# Patient Record
Sex: FEMALE | Race: WHITE | Employment: OTHER | URBAN - METROPOLITAN AREA
[De-identification: names, ages, dates, MRNs, and addresses within clinical notes are randomized per-mention and may not be internally consistent; named-entity substitution may affect disease eponyms.]

---

## 2017-10-05 PROBLEM — Z96.659 HISTORY OF PARTIAL KNEE REPLACEMENT: Status: ACTIVE | Noted: 2017-10-05

## 2017-10-05 PROBLEM — M25.561 ACUTE PAIN OF RIGHT KNEE: Status: ACTIVE | Noted: 2017-10-05

## 2018-11-06 ENCOUNTER — APPOINTMENT (OUTPATIENT)
Dept: CT IMAGING | Age: 68
End: 2018-11-06
Payer: MEDICARE

## 2018-11-06 ENCOUNTER — HOSPITAL ENCOUNTER (EMERGENCY)
Age: 68
Discharge: HOME | End: 2018-11-06
Attending: EMERGENCY MEDICINE
Payer: MEDICARE

## 2018-11-06 VITALS
DIASTOLIC BLOOD PRESSURE: 55 MMHG | HEART RATE: 89 BPM | WEIGHT: 110 LBS | TEMPERATURE: 98.4 F | SYSTOLIC BLOOD PRESSURE: 114 MMHG | RESPIRATION RATE: 16 BRPM | BODY MASS INDEX: 22.18 KG/M2 | OXYGEN SATURATION: 93 % | HEIGHT: 59 IN

## 2018-11-06 DIAGNOSIS — J32.9 CHRONIC SINUSITIS, UNSPECIFIED LOCATION: ICD-10-CM

## 2018-11-06 DIAGNOSIS — R51.9 NONINTRACTABLE HEADACHE, UNSPECIFIED CHRONICITY PATTERN, UNSPECIFIED HEADACHE TYPE: Primary | ICD-10-CM

## 2018-11-06 DIAGNOSIS — R11.2 NAUSEA AND VOMITING, INTRACTABILITY OF VOMITING NOT SPECIFIED, UNSPECIFIED VOMITING TYPE: ICD-10-CM

## 2018-11-06 PROCEDURE — 6360000002 HC RX W HCPCS: Performed by: EMERGENCY MEDICINE

## 2018-11-06 PROCEDURE — 70450 CT HEAD/BRAIN W/O DYE: CPT

## 2018-11-06 PROCEDURE — 6370000000 HC RX 637 (ALT 250 FOR IP): Performed by: EMERGENCY MEDICINE

## 2018-11-06 PROCEDURE — 2580000003 HC RX 258: Performed by: EMERGENCY MEDICINE

## 2018-11-06 PROCEDURE — 96375 TX/PRO/DX INJ NEW DRUG ADDON: CPT

## 2018-11-06 PROCEDURE — 99284 EMERGENCY DEPT VISIT MOD MDM: CPT

## 2018-11-06 PROCEDURE — 96374 THER/PROPH/DIAG INJ IV PUSH: CPT

## 2018-11-06 RX ORDER — ONDANSETRON 4 MG/1
4 TABLET, ORALLY DISINTEGRATING ORAL EVERY 8 HOURS PRN
Qty: 24 TABLET | Refills: 0 | Status: SHIPPED | OUTPATIENT
Start: 2018-11-06 | End: 2021-06-28

## 2018-11-06 RX ORDER — ACETAMINOPHEN 500 MG
1000 TABLET ORAL ONCE
Status: COMPLETED | OUTPATIENT
Start: 2018-11-06 | End: 2018-11-06

## 2018-11-06 RX ORDER — DEXAMETHASONE SODIUM PHOSPHATE 10 MG/ML
10 INJECTION INTRAMUSCULAR; INTRAVENOUS ONCE
Status: COMPLETED | OUTPATIENT
Start: 2018-11-06 | End: 2018-11-06

## 2018-11-06 RX ORDER — DIPHENHYDRAMINE HYDROCHLORIDE 50 MG/ML
50 INJECTION INTRAMUSCULAR; INTRAVENOUS ONCE
Status: COMPLETED | OUTPATIENT
Start: 2018-11-06 | End: 2018-11-06

## 2018-11-06 RX ORDER — METHYLPREDNISOLONE 4 MG/1
TABLET ORAL
Qty: 1 KIT | Status: SHIPPED | OUTPATIENT
Start: 2018-11-06 | End: 2018-11-12

## 2018-11-06 RX ORDER — 0.9 % SODIUM CHLORIDE 0.9 %
1000 INTRAVENOUS SOLUTION INTRAVENOUS ONCE
Status: COMPLETED | OUTPATIENT
Start: 2018-11-06 | End: 2018-11-06

## 2018-11-06 RX ADMIN — ACETAMINOPHEN 1000 MG: 500 TABLET ORAL at 11:22

## 2018-11-06 RX ADMIN — DIPHENHYDRAMINE HYDROCHLORIDE 50 MG: 50 INJECTION, SOLUTION INTRAMUSCULAR; INTRAVENOUS at 11:22

## 2018-11-06 RX ADMIN — SODIUM CHLORIDE 1000 ML: 9 INJECTION, SOLUTION INTRAVENOUS at 11:22

## 2018-11-06 RX ADMIN — DEXAMETHASONE SODIUM PHOSPHATE 10 MG: 10 INJECTION INTRAMUSCULAR; INTRAVENOUS at 12:09

## 2018-11-06 RX ADMIN — PROCHLORPERAZINE EDISYLATE 10 MG: 5 INJECTION INTRAMUSCULAR; INTRAVENOUS at 11:22

## 2018-11-06 ASSESSMENT — PAIN DESCRIPTION - LOCATION
LOCATION: HEAD
LOCATION: HEAD

## 2018-11-06 ASSESSMENT — ENCOUNTER SYMPTOMS
SINUS PRESSURE: 1
VOMITING: 1
NAUSEA: 1
SHORTNESS OF BREATH: 0
COUGH: 0
BACK PAIN: 0

## 2018-11-06 ASSESSMENT — PAIN SCALES - GENERAL
PAINLEVEL_OUTOF10: 7
PAINLEVEL_OUTOF10: 2
PAINLEVEL_OUTOF10: 8

## 2018-11-06 ASSESSMENT — PAIN DESCRIPTION - ORIENTATION
ORIENTATION: LEFT
ORIENTATION: LEFT

## 2018-11-06 ASSESSMENT — PAIN DESCRIPTION - DESCRIPTORS
DESCRIPTORS: ACHING;HEADACHE
DESCRIPTORS: HEADACHE;ACHING

## 2018-11-06 ASSESSMENT — PAIN DESCRIPTION - PAIN TYPE
TYPE: ACUTE PAIN
TYPE: ACUTE PAIN

## 2018-11-06 ASSESSMENT — PAIN DESCRIPTION - FREQUENCY: FREQUENCY: CONTINUOUS

## 2018-11-06 ASSESSMENT — PAIN SCALES - WONG BAKER
WONGBAKER_NUMERICALRESPONSE: 2
WONGBAKER_NUMERICALRESPONSE: 6

## 2018-11-06 NOTE — ED PROVIDER NOTES
Cardiovascular:   Tachycardic   Pulmonary/Chest: Effort normal and breath sounds normal. No respiratory distress. She has no wheezes. She has no rales. Abdominal: Soft. There is no tenderness. There is no rebound and no guarding. Musculoskeletal: She exhibits no edema. Neurological: She is alert and oriented to person, place, and time. No cranial nerve deficit. No facial droop, no nystagmus, normal finger to nose, normal gait   Skin: Skin is warm and dry. No rash noted. Psychiatric: She has a normal mood and affect. Her behavior is normal. Thought content normal.   Nursing note and vitals reviewed. Procedures    MDM  Number of Diagnoses or Management Options  Chronic sinusitis, unspecified location:   Nausea and vomiting, intractability of vomiting not specified, unspecified vomiting type:   Nonintractable headache, unspecified chronicity pattern, unspecified headache type:   Diagnosis management comments: This is a 70-year-old female with a past history of migraines who presented to the ED for evaluation of headaches. The patient stated that she didn't have any headaches for the past one month and stated they have been different than her typical migraines. On physical examination the patient was extremely well-appearing and was in no distress whatsoever. Patient had unremarkable neurological examination and CAT scan of the patient's head did not demonstrate any intracranial abnormalities. The patient was treated with IV fluids, Decadron as well as Compazine and Benadryl which on repeat examination did provide improvement. I suspect the patient's symptoms could possibly be due to sinus infection and the patient was given Solu-Medrol Dosepak for home. Patient was advised to follow-up with her primary care physician and return to ED if she should've any fevers, dizziness or any other concerns whatever. ED Course as of Nov 06 1310   Tue Nov 06, 2018   1203 Patient markedly better.   Patient having a lot of sinus issues. We'll prescribe Medrol Dosepak encourage. Patient to take her zyrtec regularly. Patient instructed to make an appointment for follow-up with Dr. Negin Chery in 2-3 days. Patient given warning signs for when to return. [PAULA]      ED Course User Index  [PAULA] Jorge Man DO         ED Course as of  1310   Tue 2018   1203 Patient markedly better. Patient having a lot of sinus issues. We'll prescribe Medrol Dosepak encourage. Patient to take her zyrtec regularly. Patient instructed to make an appointment for follow-up with Dr. Negin Chery in 2-3 days. Patient given warning signs for when to return. [PAULA]      ED Course User Index  [PAULA] Jorge Man DO       --------------------------------------------- PAST HISTORY ---------------------------------------------  Past Medical History:  has a past medical history of Anxiety; Arthritis; GERD (gastroesophageal reflux disease); H/O migraine; History of EKG; Hyperlipidemia; Insomnia; Overactive bladder; Preoperative clearance; Sinus problem; and Ventricular arrhythmia. Past Surgical History:  has a past surgical history that includes  section; Hemorrhoid surgery; blepharoplasty (Bilateral); knee surgery (Right, 5/11/15); Tooth Extraction (2016); Tonsillectomy (Right, 2016); Finger trigger release (Right, 2005); joint replacement; joint replacement (Right, 2015); Dilation and curettage of uterus (1976); and Colonoscopy. Social History:  reports that she has never smoked. She has never used smokeless tobacco. She reports that she does not drink alcohol or use drugs. Family History: family history is not on file. The patients home medications have been reviewed. Allergies: Patient has no known allergies.     -------------------------------------------------- RESULTS -------------------------------------------------  Labs:  No results found for this visit on

## 2019-06-07 ENCOUNTER — OFFICE VISIT (OUTPATIENT)
Dept: CARDIOLOGY CLINIC | Age: 69
End: 2019-06-07
Payer: MEDICARE

## 2019-06-07 ENCOUNTER — HOSPITAL ENCOUNTER (OUTPATIENT)
Dept: CARDIOLOGY | Age: 69
Discharge: HOME | End: 2019-06-07
Payer: MEDICARE

## 2019-06-07 VITALS
BODY MASS INDEX: 22.4 KG/M2 | SYSTOLIC BLOOD PRESSURE: 130 MMHG | RESPIRATION RATE: 16 BRPM | HEIGHT: 59 IN | HEART RATE: 68 BPM | WEIGHT: 111.1 LBS | DIASTOLIC BLOOD PRESSURE: 80 MMHG

## 2019-06-07 DIAGNOSIS — I38 VALVULAR HEART DISEASE: ICD-10-CM

## 2019-06-07 DIAGNOSIS — E78.2 MIXED HYPERLIPIDEMIA: ICD-10-CM

## 2019-06-07 DIAGNOSIS — I51.9 HEART PROBLEM: ICD-10-CM

## 2019-06-07 DIAGNOSIS — H53.9 VISION CHANGES: ICD-10-CM

## 2019-06-07 DIAGNOSIS — Q21.12 PFO (PATENT FORAMEN OVALE): Primary | ICD-10-CM

## 2019-06-07 LAB
LV EF: 58 %
LVEF MODALITY: NORMAL

## 2019-06-07 PROCEDURE — 93000 ELECTROCARDIOGRAM COMPLETE: CPT | Performed by: INTERNAL MEDICINE

## 2019-06-07 PROCEDURE — 99203 OFFICE O/P NEW LOW 30 MIN: CPT | Performed by: INTERNAL MEDICINE

## 2019-06-07 PROCEDURE — 93306 TTE W/DOPPLER COMPLETE: CPT

## 2019-06-07 PROCEDURE — 2580000003 HC RX 258: Performed by: INTERNAL MEDICINE

## 2019-06-07 RX ORDER — VIT C/B6/B5/MAGNESIUM/HERB 173 50-5-6-5MG
CAPSULE ORAL
COMMUNITY
End: 2022-04-30

## 2019-06-07 RX ORDER — DIPHENOXYLATE HYDROCHLORIDE AND ATROPINE SULFATE 2.5; .025 MG/1; MG/1
1 TABLET ORAL 4 TIMES DAILY PRN
COMMUNITY

## 2019-06-07 RX ORDER — SODIUM CHLORIDE 0.9 % (FLUSH) 0.9 %
10 SYRINGE (ML) INJECTION PRN
Status: DISCONTINUED | OUTPATIENT
Start: 2019-06-07 | End: 2019-06-08 | Stop reason: HOSPADM

## 2019-06-07 RX ORDER — CHLORDIAZEPOXIDE HYDROCHLORIDE 10 MG/1
10 CAPSULE, GELATIN COATED ORAL 3 TIMES DAILY PRN
COMMUNITY

## 2019-06-07 RX ADMIN — Medication 10 ML: at 14:52

## 2019-06-07 RX ADMIN — Medication 10 ML: at 14:54

## 2019-06-07 RX ADMIN — Medication 10 ML: at 14:56

## 2019-06-09 NOTE — PROGRESS NOTES
rhinorrhea  Neck: Supple, no elevated JVP, no carotid bruits  Lungs: Clear to auscultation bilaterally. No wheezes, rales, or rhonchi. Cardiac: Regular rate and rhythm, +S1S2, no murmurs apparent  Abdomen: Soft, nontender, +bowel sounds  Extremities: Moves all extremities x 4, no lower extremity edema  Neurologic: No focal motor deficits apparent, normal mood and affect  Peripheral Pulses: Intact posterior tibial pulses bilaterally    Laboratory Tests:  Lab Results   Component Value Date    CREATININE 0.7 05/12/2015    BUN 9 05/12/2015     05/12/2015    K 3.9 05/12/2015    CL 99 05/12/2015    CO2 26 05/12/2015     Lab Results   Component Value Date    WBC 6.1 05/14/2015    HGB 10.2 (L) 05/14/2015    HCT 30.0 (L) 05/14/2015    MCV 94.5 05/14/2015     05/14/2015     Cardiac Tests:  ECG: SR, rate 68, no acute STT changes    Echocardiogram: 6/7/19 ([place])   Normal left ventricular systolic function.   Ejection fraction is visually estimated at 55-60%.   Normal right ventricular size and function (TAPSE 2.2 cm).   With valsalva, right to left interatrial shunting on bubble study.   Moderate mitral regurgitation.   Mild-moderate tricuspid regurgitation.   PASP is estimated at 31 mmHg (normal estimated PASP). ASSESSMENT / PLAN:  1. PFO  2. Mild-moderate valve disease (including moderate MR)  3. History of double vision (left eye; follows with ophthalmology)  4. HLD -- on statin; lipid panel (6/4/19) -- chol 186, HDL 91, LDL 77, trig 97  5. Carotid US (5/31/19): no significant stenosis    - Echocardiogram results reviewed with the patient -- serial echocardiograms  - Education provided re: PFO -- pending clinical course, options for work-up discussed  - Continue current medications    Thank you for allowing me to participate in your patient's care. Please feel free to contact me if you have any questions or concerns.     Kevin Harrell MD  [facility]) Cardiology

## 2022-04-30 ENCOUNTER — APPOINTMENT (OUTPATIENT)
Dept: CT IMAGING | Age: 72
End: 2022-04-30
Payer: MEDICARE

## 2022-04-30 ENCOUNTER — HOSPITAL ENCOUNTER (EMERGENCY)
Age: 72
Discharge: HOME | End: 2022-04-30
Attending: STUDENT IN AN ORGANIZED HEALTH CARE EDUCATION/TRAINING PROGRAM
Payer: MEDICARE

## 2022-04-30 VITALS
HEART RATE: 72 BPM | SYSTOLIC BLOOD PRESSURE: 121 MMHG | BODY MASS INDEX: 20.4 KG/M2 | RESPIRATION RATE: 16 BRPM | OXYGEN SATURATION: 97 % | WEIGHT: 101 LBS | DIASTOLIC BLOOD PRESSURE: 62 MMHG | TEMPERATURE: 97.3 F
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
## 2022-04-30 DIAGNOSIS — S16.1XXA STRAIN OF NECK MUSCLE, INITIAL ENCOUNTER: Primary | ICD-10-CM

## 2022-04-30 PROCEDURE — 99284 EMERGENCY DEPT VISIT MOD MDM: CPT

## 2022-04-30 PROCEDURE — 96372 THER/PROPH/DIAG INJ SC/IM: CPT

## 2022-04-30 PROCEDURE — 6360000002 HC RX W HCPCS: Performed by: STUDENT IN AN ORGANIZED HEALTH CARE EDUCATION/TRAINING PROGRAM

## 2022-04-30 PROCEDURE — 6370000000 HC RX 637 (ALT 250 FOR IP): Performed by: STUDENT IN AN ORGANIZED HEALTH CARE EDUCATION/TRAINING PROGRAM

## 2022-04-30 PROCEDURE — 72125 CT NECK SPINE W/O DYE: CPT

## 2022-04-30 RX ORDER — CYCLOBENZAPRINE HCL 10 MG
10 TABLET ORAL ONCE
Status: COMPLETED | OUTPATIENT
Start: 2022-04-30 | End: 2022-04-30

## 2022-04-30 RX ORDER — KETOROLAC TROMETHAMINE 30 MG/ML
30 INJECTION, SOLUTION INTRAMUSCULAR; INTRAVENOUS ONCE
Status: COMPLETED | OUTPATIENT
Start: 2022-04-30 | End: 2022-04-30

## 2022-04-30 RX ORDER — NAPROXEN 250 MG/1
250 TABLET ORAL 2 TIMES DAILY WITH MEALS
Qty: 20 TABLET | Refills: 0 | Status: SHIPPED | OUTPATIENT
Start: 2022-04-30 | End: 2022-06-27

## 2022-04-30 RX ORDER — CYCLOBENZAPRINE HCL 10 MG
10 TABLET ORAL 3 TIMES DAILY PRN
Qty: 21 TABLET | Refills: 0 | Status: SHIPPED | OUTPATIENT
Start: 2022-04-30 | End: 2022-05-10

## 2022-04-30 RX ORDER — KETOROLAC TROMETHAMINE 30 MG/ML
30 INJECTION, SOLUTION INTRAMUSCULAR; INTRAVENOUS ONCE
Status: DISCONTINUED | OUTPATIENT
Start: 2022-04-30 | End: 2022-04-30

## 2022-04-30 RX ADMIN — CYCLOBENZAPRINE 10 MG: 10 TABLET, FILM COATED ORAL at 15:33

## 2022-04-30 RX ADMIN — KETOROLAC TROMETHAMINE 30 MG: 30 INJECTION, SOLUTION INTRAMUSCULAR at 15:33

## 2022-04-30 ASSESSMENT — ENCOUNTER SYMPTOMS
SORE THROAT: 0
COUGH: 0
ABDOMINAL PAIN: 0
APNEA: 0
DIARRHEA: 0
PHOTOPHOBIA: 0
CONSTIPATION: 0
VOMITING: 0
WHEEZING: 0
CHEST TIGHTNESS: 0
TROUBLE SWALLOWING: 0
RHINORRHEA: 0
NAUSEA: 0
EYE PAIN: 0
BACK PAIN: 0
SHORTNESS OF BREATH: 0

## 2022-04-30 ASSESSMENT — PAIN SCALES - GENERAL
PAINLEVEL_OUTOF10: 3
PAINLEVEL_OUTOF10: 5
PAINLEVEL_OUTOF10: 5

## 2022-04-30 ASSESSMENT — PAIN DESCRIPTION - PAIN TYPE: TYPE: ACUTE PAIN

## 2022-04-30 ASSESSMENT — PAIN - FUNCTIONAL ASSESSMENT
PAIN_FUNCTIONAL_ASSESSMENT: 0-10
PAIN_FUNCTIONAL_ASSESSMENT: 0-10

## 2022-04-30 ASSESSMENT — PAIN DESCRIPTION - LOCATION: LOCATION: NECK

## 2022-04-30 ASSESSMENT — PAIN DESCRIPTION - DESCRIPTORS: DESCRIPTORS: DISCOMFORT

## 2022-04-30 ASSESSMENT — PAIN DESCRIPTION - FREQUENCY: FREQUENCY: CONTINUOUS
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
## 2022-04-30 NOTE — ED NOTES
Discharge discussed w/ Patient and family. Medications, pain management, and follow up reviewed at this time with no questions.        Pernell Weinstein RN  04/30/22 [id number]

## 2024-03-07 ENCOUNTER — HOSPITAL ENCOUNTER (OUTPATIENT)
Dept: GENERAL RADIOLOGY | Age: 74
Discharge: HOME | End: 2024-03-09
Attending: FAMILY MEDICINE
Payer: MEDICARE

## 2024-03-07 ENCOUNTER — HOSPITAL ENCOUNTER (OUTPATIENT)
Age: 74
Discharge: HOME | End: 2024-03-09
Payer: MEDICARE

## 2024-03-07 DIAGNOSIS — M25.551 RIGHT HIP PAIN: ICD-10-CM

## 2024-03-07 DIAGNOSIS — M54.50 LOW BACK PAIN, UNSPECIFIED BACK PAIN LATERALITY, UNSPECIFIED CHRONICITY, UNSPECIFIED WHETHER SCIATICA PRESENT: ICD-10-CM

## 2024-03-07 PROCEDURE — 72110 X-RAY EXAM L-2 SPINE 4/>VWS: CPT

## 2024-03-07 PROCEDURE — 73502 X-RAY EXAM HIP UNI 2-3 VIEWS: CPT

## 2024-04-19 ENCOUNTER — HOSPITAL ENCOUNTER (OUTPATIENT)
Dept: GENERAL RADIOLOGY | Age: 74
End: 2024-04-19
Payer: MEDICARE

## 2024-04-19 ENCOUNTER — HOSPITAL ENCOUNTER (OUTPATIENT)
Age: 74
End: 2024-04-19
Payer: MEDICARE

## 2024-04-19 DIAGNOSIS — M25.561 RIGHT KNEE PAIN, UNSPECIFIED CHRONICITY: ICD-10-CM

## 2024-04-19 PROCEDURE — 73564 X-RAY EXAM KNEE 4 OR MORE: CPT

## 2025-06-30 ENCOUNTER — APPOINTMENT (OUTPATIENT)
Dept: GENERAL RADIOLOGY | Age: 75
End: 2025-06-30
Payer: MEDICARE

## 2025-06-30 ENCOUNTER — HOSPITAL ENCOUNTER (EMERGENCY)
Age: 75
Discharge: HOME | End: 2025-06-30
Attending: EMERGENCY MEDICINE
Payer: MEDICARE

## 2025-06-30 VITALS
DIASTOLIC BLOOD PRESSURE: 68 MMHG | WEIGHT: 98 LBS | HEART RATE: 71 BPM | HEIGHT: 57 IN | TEMPERATURE: 97.3 F | RESPIRATION RATE: 18 BRPM | OXYGEN SATURATION: 100 % | BODY MASS INDEX: 21.14 KG/M2 | SYSTOLIC BLOOD PRESSURE: 143 MMHG

## 2025-06-30 DIAGNOSIS — M25.552 LEFT HIP PAIN: Primary | ICD-10-CM

## 2025-06-30 PROCEDURE — 6360000002 HC RX W HCPCS: Performed by: EMERGENCY MEDICINE

## 2025-06-30 PROCEDURE — 96372 THER/PROPH/DIAG INJ SC/IM: CPT

## 2025-06-30 PROCEDURE — 73502 X-RAY EXAM HIP UNI 2-3 VIEWS: CPT

## 2025-06-30 PROCEDURE — 99284 EMERGENCY DEPT VISIT MOD MDM: CPT

## 2025-06-30 RX ORDER — HYDROCODONE BITARTRATE AND ACETAMINOPHEN 5; 325 MG/1; MG/1
1 TABLET ORAL EVERY 6 HOURS PRN
Qty: 10 TABLET | Refills: 0 | Status: SHIPPED | OUTPATIENT
Start: 2025-06-30 | End: 2025-07-03

## 2025-06-30 RX ORDER — NAPROXEN 500 MG/1
500 TABLET ORAL 2 TIMES DAILY PRN
Qty: 60 TABLET | Refills: 0 | Status: ON HOLD | OUTPATIENT
Start: 2025-06-30 | End: 2025-07-03 | Stop reason: HOSPADM

## 2025-06-30 RX ORDER — PREDNISONE 50 MG/1
50 TABLET ORAL DAILY
Qty: 5 TABLET | Refills: 0 | Status: SHIPPED | OUTPATIENT
Start: 2025-06-30 | End: 2025-07-01 | Stop reason: SINTOL

## 2025-06-30 RX ORDER — MORPHINE SULFATE 4 MG/ML
4 INJECTION, SOLUTION INTRAMUSCULAR; INTRAVENOUS ONCE
Status: COMPLETED | OUTPATIENT
Start: 2025-06-30 | End: 2025-06-30

## 2025-06-30 RX ORDER — KETOROLAC TROMETHAMINE 15 MG/ML
15 INJECTION, SOLUTION INTRAMUSCULAR; INTRAVENOUS ONCE
Status: COMPLETED | OUTPATIENT
Start: 2025-06-30 | End: 2025-06-30

## 2025-06-30 RX ADMIN — KETOROLAC TROMETHAMINE 15 MG: 15 INJECTION, SOLUTION INTRAMUSCULAR; INTRAVENOUS at 16:09

## 2025-06-30 RX ADMIN — MORPHINE SULFATE 4 MG: 4 INJECTION, SOLUTION INTRAMUSCULAR; INTRAVENOUS at 16:12

## 2025-06-30 ASSESSMENT — PAIN SCALES - GENERAL
PAINLEVEL_OUTOF10: 10

## 2025-06-30 ASSESSMENT — PAIN DESCRIPTION - DESCRIPTORS
DESCRIPTORS: ACHING
DESCRIPTORS: DISCOMFORT

## 2025-06-30 ASSESSMENT — LIFESTYLE VARIABLES
HOW MANY STANDARD DRINKS CONTAINING ALCOHOL DO YOU HAVE ON A TYPICAL DAY: PATIENT DOES NOT DRINK
HOW OFTEN DO YOU HAVE A DRINK CONTAINING ALCOHOL: NEVER

## 2025-06-30 ASSESSMENT — PAIN DESCRIPTION - ORIENTATION
ORIENTATION: LEFT

## 2025-06-30 ASSESSMENT — PAIN DESCRIPTION - LOCATION
LOCATION: HIP

## 2025-06-30 ASSESSMENT — PAIN - FUNCTIONAL ASSESSMENT
PAIN_FUNCTIONAL_ASSESSMENT: PREVENTS OR INTERFERES SOME ACTIVE ACTIVITIES AND ADLS
PAIN_FUNCTIONAL_ASSESSMENT: PREVENTS OR INTERFERES SOME ACTIVE ACTIVITIES AND ADLS
PAIN_FUNCTIONAL_ASSESSMENT: 0-10

## 2025-06-30 ASSESSMENT — PAIN DESCRIPTION - PAIN TYPE: TYPE: ACUTE PAIN

## 2025-06-30 NOTE — ED PROVIDER NOTES
[facility] EMERGENCY DEPARTMENT  EMERGENCY DEPARTMENT ENCOUNTER      Pt Name: Sharon Velasco  MRN: [id number]  Birthdate 1950  Date of evaluation: 2025  Provider: Rob Donnelly MD     CHIEF COMPLAINT       Chief Complaint   Patient presents with    Hip Pain     Left side- hx of osteopenia         HISTORY OF PRESENT ILLNESS   (Location/Symptom, Timing/Onset, Context/Setting, Quality, Duration, Modifying Factors, Severity) Note limiting factors.        HPI    Sharon Velasco is a 74 y.o. female who presents to the emergency department 1 week of left hip pain she has been walking on it okay until today when she bent down to pick something up and then she developed worsening pain in hip she has pain with range of motion of the hip.  She has known arthritis.  No fevers no immunosuppression no history of cancer or chemotherapy no numbness or weakness in the leg no saddle anesthesia or urinary retention or other new complaints    Nursing Notes were reviewed.    REVIEW OF SYSTEMS    (2+ for level 4; 10+ for level 5)   Review of Systems    PAST MEDICAL HISTORY     Past Medical History:   Diagnosis Date    Anxiety 2015    stable    Arthritis     osteo    GERD (gastroesophageal reflux disease) 2015    H/O migraine     History of EKG 2016    per Dr JENIFER Edwards on chart.    Hyperlipidemia     Insomnia     Overactive bladder     Preoperative clearance 5-4-15    Medical clearance for OR 5-11-15 from Dr. Edwards on paper chart. also preop clearance, bmp & cbc results per dr Edwards of 2016 for surgery 2016 on chart.    Sinus problem     Ventricular arrhythmia     PVC's       SURGICAL HISTORY       Past Surgical History:   Procedure Laterality Date    BLEPHAROPLASTY Bilateral      SECTION      COLONOSCOPY      DILATION AND CURETTAGE OF UTERUS  1976    FINGER TRIGGER RELEASE Right 2005    ring    HEMORRHOID SURGERY      JOINT REPLACEMENT      JOINT

## 2025-07-01 ENCOUNTER — APPOINTMENT (OUTPATIENT)
Dept: CT IMAGING | Age: 75
End: 2025-07-01
Payer: MEDICARE

## 2025-07-01 ENCOUNTER — HOSPITAL ENCOUNTER (EMERGENCY)
Age: 75
Discharge: HOME | End: 2025-07-01
Attending: STUDENT IN AN ORGANIZED HEALTH CARE EDUCATION/TRAINING PROGRAM
Payer: MEDICARE

## 2025-07-01 VITALS
RESPIRATION RATE: 18 BRPM | DIASTOLIC BLOOD PRESSURE: 71 MMHG | HEART RATE: 90 BPM | SYSTOLIC BLOOD PRESSURE: 136 MMHG | OXYGEN SATURATION: 96 % | TEMPERATURE: 98.7 F

## 2025-07-01 DIAGNOSIS — M25.552 LEFT HIP PAIN: Primary | ICD-10-CM

## 2025-07-01 LAB
ANION GAP SERPL CALCULATED.3IONS-SCNC: 15 MMOL/L (ref 7–16)
BASOPHILS # BLD: 0.02 K/UL (ref 0–0.2)
BASOPHILS NFR BLD: 0 % (ref 0–2)
BUN SERPL-MCNC: 8 MG/DL (ref 8–23)
CALCIUM SERPL-MCNC: 9 MG/DL (ref 8.8–10.2)
CHLORIDE SERPL-SCNC: 96 MMOL/L (ref 98–107)
CO2 SERPL-SCNC: 21 MMOL/L (ref 22–29)
CREAT SERPL-MCNC: 0.6 MG/DL (ref 0.5–1)
CRP SERPL HS-MCNC: <3 MG/L (ref 0–5)
EOSINOPHIL # BLD: 0 K/UL (ref 0.05–0.5)
EOSINOPHILS RELATIVE PERCENT: 0 % (ref 0–6)
ERYTHROCYTE [DISTWIDTH] IN BLOOD BY AUTOMATED COUNT: 11.6 % (ref 11.5–15)
ERYTHROCYTE [SEDIMENTATION RATE] IN BLOOD BY WESTERGREN METHOD: 2 MM/HR (ref 0–20)
GFR, ESTIMATED: >90 ML/MIN/1.73M2
GLUCOSE SERPL-MCNC: 110 MG/DL (ref 74–99)
HCT VFR BLD AUTO: 32.4 % (ref 34–48)
HGB BLD-MCNC: 11.6 G/DL (ref 11.5–15.5)
IMM GRANULOCYTES # BLD AUTO: 0.04 K/UL (ref 0–0.58)
IMM GRANULOCYTES NFR BLD: 1 % (ref 0–5)
LYMPHOCYTES NFR BLD: 0.85 K/UL (ref 1.5–4)
LYMPHOCYTES RELATIVE PERCENT: 11 % (ref 20–42)
MCHC RBC AUTO-ENTMCNC: 35.8 G/DL (ref 32–34.5)
MCV RBC AUTO: 90.3 FL (ref 80–99.9)
MONOCYTES NFR BLD: 0.36 K/UL (ref 0.1–0.95)
MONOCYTES NFR BLD: 4 % (ref 2–12)
NEUTROPHILS NFR BLD: 84 % (ref 43–80)
NEUTS SEG NFR BLD: 6.82 K/UL (ref 1.8–7.3)
PLATELET # BLD AUTO: 293 K/UL (ref 130–450)
POTASSIUM SERPL-SCNC: 3.7 MMOL/L (ref 3.5–5.1)
RBC # BLD AUTO: 3.59 M/UL (ref 3.5–5.5)
SODIUM SERPL-SCNC: 132 MMOL/L (ref 136–145)

## 2025-07-01 PROCEDURE — 6370000000 HC RX 637 (ALT 250 FOR IP): Performed by: STUDENT IN AN ORGANIZED HEALTH CARE EDUCATION/TRAINING PROGRAM

## 2025-07-01 PROCEDURE — 99284 EMERGENCY DEPT VISIT MOD MDM: CPT

## 2025-07-01 PROCEDURE — 36415 COLL VENOUS BLD VENIPUNCTURE: CPT

## 2025-07-01 PROCEDURE — 85652 RBC SED RATE AUTOMATED: CPT

## 2025-07-01 PROCEDURE — 73700 CT LOWER EXTREMITY W/O DYE: CPT

## 2025-07-01 PROCEDURE — 86140 C-REACTIVE PROTEIN: CPT

## 2025-07-01 PROCEDURE — 72131 CT LUMBAR SPINE W/O DYE: CPT

## 2025-07-01 PROCEDURE — 80048 BASIC METABOLIC PNL TOTAL CA: CPT

## 2025-07-01 PROCEDURE — 6370000000 HC RX 637 (ALT 250 FOR IP)

## 2025-07-01 PROCEDURE — 85025 COMPLETE CBC W/AUTO DIFF WBC: CPT

## 2025-07-01 RX ORDER — METHOCARBAMOL 500 MG/1
500 TABLET, FILM COATED ORAL ONCE
Status: COMPLETED | OUTPATIENT
Start: 2025-07-01 | End: 2025-07-01

## 2025-07-01 RX ORDER — PREDNISONE 20 MG/1
20 TABLET ORAL 2 TIMES DAILY
Qty: 10 TABLET | Refills: 0 | Status: ON HOLD | OUTPATIENT
Start: 2025-07-01 | End: 2025-07-03 | Stop reason: HOSPADM

## 2025-07-01 RX ORDER — ONDANSETRON 4 MG/1
4 TABLET, ORALLY DISINTEGRATING ORAL ONCE
Status: COMPLETED | OUTPATIENT
Start: 2025-07-01 | End: 2025-07-01

## 2025-07-01 RX ORDER — LIDOCAINE 4 G/G
1 PATCH TOPICAL DAILY
Status: DISCONTINUED | OUTPATIENT
Start: 2025-07-01 | End: 2025-07-01 | Stop reason: HOSPADM

## 2025-07-01 RX ORDER — PREDNISONE 20 MG/1
20 TABLET ORAL ONCE
Status: COMPLETED | OUTPATIENT
Start: 2025-07-01 | End: 2025-07-01

## 2025-07-01 RX ADMIN — PREDNISONE 20 MG: 20 TABLET ORAL at 15:47

## 2025-07-01 RX ADMIN — ONDANSETRON 4 MG: 4 TABLET, ORALLY DISINTEGRATING ORAL at 12:19

## 2025-07-01 RX ADMIN — METHOCARBAMOL 500 MG: 500 TABLET ORAL at 12:19

## 2025-07-01 ASSESSMENT — PAIN DESCRIPTION - DESCRIPTORS: DESCRIPTORS: ACHING;DULL

## 2025-07-01 ASSESSMENT — PAIN DESCRIPTION - LOCATION: LOCATION: HIP

## 2025-07-01 ASSESSMENT — PAIN DESCRIPTION - ORIENTATION: ORIENTATION: LEFT

## 2025-07-01 ASSESSMENT — PAIN SCALES - GENERAL: PAINLEVEL_OUTOF10: 9

## 2025-07-01 ASSESSMENT — PAIN - FUNCTIONAL ASSESSMENT: PAIN_FUNCTIONAL_ASSESSMENT: 0-10

## 2025-07-01 NOTE — ED NOTES
Ambulated patient to restroom and back with little to no difficulty. Patient states that she couldn't bear her entire weight on the left side but was able to ambulate.

## 2025-07-01 NOTE — ED PROVIDER NOTES
[facility] EMERGENCY DEPARTMENT  EMERGENCY DEPARTMENT ENCOUNTER        Pt Name: Sharon Velasco  MRN: [id number]  Birthdate 1950  Date of evaluation: 2025  Provider: Roosevelt Carroll MD  PCP: Toby Robles MD  Note Started: 11:30 AM EDT 25    CHIEF COMPLAINT       Chief Complaint   Patient presents with    Hip Pain       HISTORY OF PRESENT ILLNESS: 1 or more Elements        Limitations to history : None    Sharon Velasco is a 74 y.o. female who presents to the emergency room for left-sided hip pain and lower back pain.  No tingling or numbness.  No saddle anesthesia.  No loss of control of bowels or bladder.  Did bend to pick something up the other day and felt a crack in her back and has had significant pain since.  No measured fevers, has had some chills.    Nursing Notes were all reviewed and agreed with or any disagreements were addressed in the HPI.      REVIEW OF EXTERNAL NOTE :       As below    REVIEW OF SYSTEMS :      Positives and Pertinent negatives as per HPI.     SURGICAL HISTORY     Past Surgical History:   Procedure Laterality Date    BLEPHAROPLASTY Bilateral      SECTION      COLONOSCOPY      DILATION AND CURETTAGE OF UTERUS  1976    FINGER TRIGGER RELEASE Right 2005    ring    HEMORRHOID SURGERY      JOINT REPLACEMENT      JOINT REPLACEMENT Right 2015    Medial Unicondylar Arthroplasty    KNEE SURGERY Right 5/11/15    partial    TONSILLECTOMY Right 2016    with right tongue biopsy    TOOTH EXTRACTION  2016       CURRENTMEDICATIONS       Previous Medications    ASCORBIC ACID (VITAMIN C) 500 MG TABLET    Take 500 mg by mouth daily    ATORVASTATIN (LIPITOR) 10 MG TABLET    Take 5 mg by mouth daily    B COMPLEX VITAMINS CAPSULE    Take 1 capsule by mouth daily    BUTALBITAL-APAP-CAFFEINE (FIORICET PO)    Take by mouth every 4 hours as needed    CETIRIZINE (ZYRTEC) 10 MG TABLET    Take 10 mg by mouth daily

## 2025-07-01 NOTE — ED PROVIDER NOTES
[facility] EMERGENCY DEPARTMENT  EMERGENCY DEPARTMENT ENCOUNTER        Pt Name: Sharon Velasco  MRN: [id number]  Birthdate 1950  Date of evaluation: 2025  Provider: Rony Mota II, DO  PCP: Toby Robles MD  Note Started: 9:04 PM EDT 25    CHIEF COMPLAINT       Chief Complaint   Patient presents with    Hip Pain       HISTORY OF PRESENT ILLNESS: 1 or more Elements            Sharon Velasco is a 74 y.o. female history of GERD, arthritis, presents ER for complaint of persistent left hip pain.  Patient does have chronic lumbar radiculopathy, states that current symptoms feel like an exacerbation of her chronic symptoms, states that she was seen at [facility] yesterday for similar complaint, had negative x-ray images.  Patient's PCP did call stating that this current episode does not feel like her typical radiculopathy symptoms.  Patient adamantly denies any injuries or traumas, denies any saddle anesthesias, denies any distal paresthesias, denies any fevers or chills, no recent surgery to her spine.    Nursing Notes were all reviewed and agreed with or any disagreements were addressed in the HPI.      REVIEW OF EXTERNAL NOTE :       Records reviewed for medical hx, surgical, hx, and medication lists      Chart Review/External Note Review    Last Echo reviewed by Me:  Lab Results   Component Value Date    LVEF 58 2019             Controlled Substance Monitoring:    Acute and Chronic Pain Monitoring:        No data to display                    REVIEW OF SYSTEMS :      Positives and Pertinent negatives as per HPI.     SURGICAL HISTORY     Past Surgical History:   Procedure Laterality Date    BLEPHAROPLASTY Bilateral      SECTION      COLONOSCOPY      DILATION AND CURETTAGE OF UTERUS  1976    FINGER TRIGGER RELEASE Right 2005    ring    HEMORRHOID SURGERY      JOINT REPLACEMENT      JOINT REPLACEMENT Right 2015    [facility]

## 2025-07-01 NOTE — DISCHARGE INSTRUCTIONS
Return to the emergency department if you start having issues with numbness in your groin, you are losing control your bowels or bladder, you are developing significant leg weakness.

## 2025-07-02 ENCOUNTER — APPOINTMENT (OUTPATIENT)
Dept: CT IMAGING | Age: 75
End: 2025-07-02
Attending: STUDENT IN AN ORGANIZED HEALTH CARE EDUCATION/TRAINING PROGRAM
Payer: MEDICARE

## 2025-07-02 ENCOUNTER — HOSPITAL ENCOUNTER (OUTPATIENT)
Age: 75
Setting detail: OBSERVATION
LOS: 1 days | Discharge: NURSING, REHAB OR LONG-TERM CARE | End: 2025-07-03
Attending: STUDENT IN AN ORGANIZED HEALTH CARE EDUCATION/TRAINING PROGRAM | Admitting: INTERNAL MEDICINE
Payer: MEDICARE

## 2025-07-02 DIAGNOSIS — M54.9 INTRACTABLE BACK PAIN: Primary | ICD-10-CM

## 2025-07-02 LAB
ALBUMIN SERPL-MCNC: 4.5 G/DL (ref 3.5–5.2)
ALP SERPL-CCNC: 67 U/L (ref 35–104)
ALT SERPL-CCNC: 21 U/L (ref 0–35)
ANION GAP SERPL CALCULATED.3IONS-SCNC: 19 MMOL/L (ref 7–16)
AST SERPL-CCNC: 30 U/L (ref 0–35)
BACTERIA URNS QL MICRO: ABNORMAL
BASOPHILS # BLD: 0.01 K/UL (ref 0–0.2)
BASOPHILS NFR BLD: 0 % (ref 0–2)
BILIRUB DIRECT SERPL-MCNC: 0.2 MG/DL (ref 0–0.2)
BILIRUB INDIRECT SERPL-MCNC: 0.3 MG/DL (ref 0–1)
BILIRUB SERPL-MCNC: 0.5 MG/DL (ref 0–1.2)
BILIRUB UR QL STRIP: NEGATIVE
BUN SERPL-MCNC: 12 MG/DL (ref 8–23)
CALCIUM SERPL-MCNC: 10 MG/DL (ref 8.8–10.2)
CHLORIDE SERPL-SCNC: 93 MMOL/L (ref 98–107)
CLARITY UR: ABNORMAL
CO2 SERPL-SCNC: 18 MMOL/L (ref 22–29)
COLOR UR: YELLOW
CREAT SERPL-MCNC: 0.7 MG/DL (ref 0.5–1)
EOSINOPHIL # BLD: 0 K/UL (ref 0.05–0.5)
EOSINOPHILS RELATIVE PERCENT: 0 % (ref 0–6)
ERYTHROCYTE [DISTWIDTH] IN BLOOD BY AUTOMATED COUNT: 11.5 % (ref 11.5–15)
GFR, ESTIMATED: >90 ML/MIN/1.73M2
GLUCOSE SERPL-MCNC: 115 MG/DL (ref 74–99)
GLUCOSE UR STRIP-MCNC: NEGATIVE MG/DL
HCT VFR BLD AUTO: 32.2 % (ref 34–48)
HGB BLD-MCNC: 11.8 G/DL (ref 11.5–15.5)
HGB UR QL STRIP.AUTO: ABNORMAL
IMM GRANULOCYTES # BLD AUTO: 0.03 K/UL (ref 0–0.58)
IMM GRANULOCYTES NFR BLD: 0 % (ref 0–5)
KETONES UR STRIP-MCNC: >80 MG/DL
LACTATE BLDV-SCNC: 1.8 MMOL/L (ref 0.5–2.2)
LEUKOCYTE ESTERASE UR QL STRIP: NEGATIVE
LIPASE SERPL-CCNC: 36 U/L (ref 13–60)
LYMPHOCYTES NFR BLD: 0.84 K/UL (ref 1.5–4)
LYMPHOCYTES RELATIVE PERCENT: 12 % (ref 20–42)
MCH RBC QN AUTO: 32.2 PG (ref 26–35)
MCHC RBC AUTO-ENTMCNC: 36.6 G/DL (ref 32–34.5)
MCV RBC AUTO: 88 FL (ref 80–99.9)
MONOCYTES NFR BLD: 0.3 K/UL (ref 0.1–0.95)
MONOCYTES NFR BLD: 4 % (ref 2–12)
NEUTROPHILS NFR BLD: 84 % (ref 43–80)
NEUTS SEG NFR BLD: 6.07 K/UL (ref 1.8–7.3)
NITRITE UR QL STRIP: NEGATIVE
PH UR STRIP: 6 [PH] (ref 5–8)
PLATELET # BLD AUTO: 329 K/UL (ref 130–450)
PMV BLD AUTO: 10.1 FL (ref 7–12)
POTASSIUM SERPL-SCNC: 3.8 MMOL/L (ref 3.5–5.1)
PROT SERPL-MCNC: 7 G/DL (ref 6.4–8.3)
PROT UR STRIP-MCNC: NEGATIVE MG/DL
RBC # BLD AUTO: 3.66 M/UL (ref 3.5–5.5)
RBC #/AREA URNS HPF: ABNORMAL /HPF
SODIUM SERPL-SCNC: 130 MMOL/L (ref 136–145)
SP GR UR STRIP: 1.01 (ref 1–1.03)
UROBILINOGEN UR STRIP-ACNC: 0.2 EU/DL (ref 0–1)
WBC #/AREA URNS HPF: ABNORMAL /HPF
WBC OTHER # BLD: 7.3 K/UL (ref 4.5–11.5)

## 2025-07-02 PROCEDURE — 87086 URINE CULTURE/COLONY COUNT: CPT

## 2025-07-02 PROCEDURE — 99285 EMERGENCY DEPT VISIT HI MDM: CPT

## 2025-07-02 PROCEDURE — 85025 COMPLETE CBC W/AUTO DIFF WBC: CPT

## 2025-07-02 PROCEDURE — 96375 TX/PRO/DX INJ NEW DRUG ADDON: CPT

## 2025-07-02 PROCEDURE — 96374 THER/PROPH/DIAG INJ IV PUSH: CPT

## 2025-07-02 PROCEDURE — 74177 CT ABD & PELVIS W/CONTRAST: CPT

## 2025-07-02 PROCEDURE — 6360000002 HC RX W HCPCS: Performed by: STUDENT IN AN ORGANIZED HEALTH CARE EDUCATION/TRAINING PROGRAM

## 2025-07-02 PROCEDURE — 83690 ASSAY OF LIPASE: CPT

## 2025-07-02 PROCEDURE — 82248 BILIRUBIN DIRECT: CPT

## 2025-07-02 PROCEDURE — 6360000004 HC RX CONTRAST MEDICATION: Performed by: RADIOLOGY

## 2025-07-02 PROCEDURE — 1200000000 HC SEMI PRIVATE

## 2025-07-02 PROCEDURE — 83605 ASSAY OF LACTIC ACID: CPT

## 2025-07-02 PROCEDURE — 36415 COLL VENOUS BLD VENIPUNCTURE: CPT

## 2025-07-02 PROCEDURE — 80053 COMPREHEN METABOLIC PANEL: CPT

## 2025-07-02 PROCEDURE — 81001 URINALYSIS AUTO W/SCOPE: CPT

## 2025-07-02 RX ORDER — IOPAMIDOL 755 MG/ML
75 INJECTION, SOLUTION INTRAVASCULAR
Status: COMPLETED | OUTPATIENT
Start: 2025-07-02 | End: 2025-07-02

## 2025-07-02 RX ORDER — ONDANSETRON 2 MG/ML
4 INJECTION INTRAMUSCULAR; INTRAVENOUS ONCE
Status: COMPLETED | OUTPATIENT
Start: 2025-07-02 | End: 2025-07-02

## 2025-07-02 RX ORDER — MORPHINE SULFATE 2 MG/ML
2 INJECTION, SOLUTION INTRAMUSCULAR; INTRAVENOUS ONCE
Status: COMPLETED | OUTPATIENT
Start: 2025-07-02 | End: 2025-07-02

## 2025-07-02 RX ORDER — MORPHINE SULFATE 4 MG/ML
4 INJECTION, SOLUTION INTRAMUSCULAR; INTRAVENOUS ONCE
Refills: 0 | Status: COMPLETED | OUTPATIENT
Start: 2025-07-02 | End: 2025-07-03

## 2025-07-02 RX ADMIN — IOPAMIDOL 75 ML: 755 INJECTION, SOLUTION INTRAVENOUS at 20:10

## 2025-07-02 RX ADMIN — MORPHINE SULFATE 2 MG: 2 INJECTION, SOLUTION INTRAMUSCULAR; INTRAVENOUS at 16:20

## 2025-07-02 RX ADMIN — ONDANSETRON 4 MG: 2 INJECTION, SOLUTION INTRAMUSCULAR; INTRAVENOUS at 16:17

## 2025-07-02 ASSESSMENT — PAIN DESCRIPTION - DESCRIPTORS: DESCRIPTORS: SHARP;SHOOTING

## 2025-07-02 ASSESSMENT — PAIN SCALES - GENERAL: PAINLEVEL_OUTOF10: 10

## 2025-07-02 ASSESSMENT — PAIN DESCRIPTION - LOCATION: LOCATION: BACK

## 2025-07-02 ASSESSMENT — PAIN DESCRIPTION - ORIENTATION: ORIENTATION: LOWER

## 2025-07-02 NOTE — ED PROVIDER NOTES
[facility] EMERGENCY DEPARTMENT  EMERGENCY DEPARTMENT ENCOUNTER        Pt Name: Sharon Velasco  MRN: [id number]  Birthdate 1950  Date of evaluation: 2025  Provider: Roosevelt Carroll MD  PCP: Toby Robles MD  Note Started: 3:15 PM EDT 25    CHIEF COMPLAINT       Chief Complaint   Patient presents with    Hip Pain     Left hip pain, reports hx osteoarthritis an siatica, seen here yesterday for the same       HISTORY OF PRESENT ILLNESS: 1 or more Elements        Limitations to history : None    Sharon Velasco is a 74 y.o. female who presents to the emergency room for hip pain, left lower quadrant pain, nausea.  Has been seen multiple times in the ED recently for left hip pain, but she started having some issues with abdominal spasming and pain beginning last night.  No fevers, no chills, has had some nausea without vomiting.  Denies any urinary tract infection symptoms.  Took prednisone along with Norco without relief.    Nursing Notes were all reviewed and agreed with or any disagreements were addressed in the HPI.      REVIEW OF EXTERNAL NOTE :       As below    REVIEW OF SYSTEMS :      Positives and Pertinent negatives as per HPI.     SURGICAL HISTORY     Past Surgical History:   Procedure Laterality Date    BLEPHAROPLASTY Bilateral      SECTION      COLONOSCOPY      DILATION AND CURETTAGE OF UTERUS  1976    FINGER TRIGGER RELEASE Right 2005    ring    HEMORRHOID SURGERY      JOINT REPLACEMENT      JOINT REPLACEMENT Right 2015    Medial Unicondylar Arthroplasty    KNEE SURGERY Right 5/11/15    partial    TONSILLECTOMY Right 2016    with right tongue biopsy    TOOTH EXTRACTION  2016       CURRENTMEDICATIONS       Previous Medications    ASCORBIC ACID (VITAMIN C) 500 MG TABLET    Take 500 mg by mouth daily    ATORVASTATIN (LIPITOR) 10 MG TABLET    Take 5 mg by mouth daily    B COMPLEX VITAMINS CAPSULE    Take 1 capsule by mouth

## 2025-07-03 VITALS
HEART RATE: 89 BPM | OXYGEN SATURATION: 97 % | WEIGHT: 98.11 LBS | HEIGHT: 57 IN | TEMPERATURE: 97.8 F | SYSTOLIC BLOOD PRESSURE: 137 MMHG | DIASTOLIC BLOOD PRESSURE: 69 MMHG | RESPIRATION RATE: 16 BRPM | BODY MASS INDEX: 21.17 KG/M2

## 2025-07-03 PROBLEM — M54.50 BACK PAIN AT L4-L5 LEVEL: Status: ACTIVE | Noted: 2025-07-03

## 2025-07-03 LAB
MICROORGANISM SPEC CULT: ABNORMAL
SERVICE CMNT-IMP: ABNORMAL
SPECIMEN DESCRIPTION: ABNORMAL

## 2025-07-03 PROCEDURE — 6360000002 HC RX W HCPCS: Performed by: STUDENT IN AN ORGANIZED HEALTH CARE EDUCATION/TRAINING PROGRAM

## 2025-07-03 PROCEDURE — 97161 PT EVAL LOW COMPLEX 20 MIN: CPT

## 2025-07-03 PROCEDURE — 97165 OT EVAL LOW COMPLEX 30 MIN: CPT

## 2025-07-03 PROCEDURE — G0378 HOSPITAL OBSERVATION PER HR: HCPCS

## 2025-07-03 PROCEDURE — 6370000000 HC RX 637 (ALT 250 FOR IP): Performed by: INTERNAL MEDICINE

## 2025-07-03 PROCEDURE — 99222 1ST HOSP IP/OBS MODERATE 55: CPT | Performed by: NEUROLOGICAL SURGERY

## 2025-07-03 PROCEDURE — 97530 THERAPEUTIC ACTIVITIES: CPT

## 2025-07-03 PROCEDURE — 97535 SELF CARE MNGMENT TRAINING: CPT

## 2025-07-03 PROCEDURE — 2500000003 HC RX 250 WO HCPCS: Performed by: INTERNAL MEDICINE

## 2025-07-03 PROCEDURE — 6360000002 HC RX W HCPCS: Performed by: INTERNAL MEDICINE

## 2025-07-03 RX ORDER — PANTOPRAZOLE SODIUM 40 MG/1
40 TABLET, DELAYED RELEASE ORAL
Status: DISCONTINUED | OUTPATIENT
Start: 2025-07-03 | End: 2025-07-03 | Stop reason: HOSPADM

## 2025-07-03 RX ORDER — ACETAMINOPHEN 650 MG/1
650 SUPPOSITORY RECTAL EVERY 6 HOURS PRN
Status: DISCONTINUED | OUTPATIENT
Start: 2025-07-03 | End: 2025-07-03 | Stop reason: HOSPADM

## 2025-07-03 RX ORDER — MAGNESIUM SULFATE HEPTAHYDRATE 40 MG/ML
2000 INJECTION, SOLUTION INTRAVENOUS PRN
Status: DISCONTINUED | OUTPATIENT
Start: 2025-07-03 | End: 2025-07-03 | Stop reason: HOSPADM

## 2025-07-03 RX ORDER — CYCLOBENZAPRINE HCL 10 MG
5 TABLET ORAL 3 TIMES DAILY PRN
Status: DISCONTINUED | OUTPATIENT
Start: 2025-07-03 | End: 2025-07-03 | Stop reason: HOSPADM

## 2025-07-03 RX ORDER — CETIRIZINE HYDROCHLORIDE 10 MG/1
10 TABLET ORAL DAILY
Status: DISCONTINUED | OUTPATIENT
Start: 2025-07-03 | End: 2025-07-03 | Stop reason: HOSPADM

## 2025-07-03 RX ORDER — GABAPENTIN 300 MG/1
300 CAPSULE ORAL 3 TIMES DAILY
Qty: 90 CAPSULE | Refills: 3 | Status: CANCELLED | OUTPATIENT
Start: 2025-07-03 | End: 2025-08-02

## 2025-07-03 RX ORDER — ENOXAPARIN SODIUM 100 MG/ML
30 INJECTION SUBCUTANEOUS DAILY
Status: DISCONTINUED | OUTPATIENT
Start: 2025-07-03 | End: 2025-07-03 | Stop reason: HOSPADM

## 2025-07-03 RX ORDER — CYCLOBENZAPRINE HCL 5 MG
5 TABLET ORAL 3 TIMES DAILY PRN
Qty: 30 TABLET | Refills: 0 | Status: SHIPPED | OUTPATIENT
Start: 2025-07-03 | End: 2025-07-13

## 2025-07-03 RX ORDER — GABAPENTIN 300 MG/1
300 CAPSULE ORAL 3 TIMES DAILY
Qty: 90 CAPSULE | Refills: 0 | Status: SHIPPED | OUTPATIENT
Start: 2025-07-03 | End: 2025-08-02

## 2025-07-03 RX ORDER — HYDROCODONE BITARTRATE AND ACETAMINOPHEN 5; 325 MG/1; MG/1
1 TABLET ORAL EVERY 6 HOURS PRN
Status: DISCONTINUED | OUTPATIENT
Start: 2025-07-03 | End: 2025-07-03 | Stop reason: HOSPADM

## 2025-07-03 RX ORDER — ATORVASTATIN CALCIUM 10 MG/1
5 TABLET, FILM COATED ORAL DAILY
Status: DISCONTINUED | OUTPATIENT
Start: 2025-07-03 | End: 2025-07-03 | Stop reason: HOSPADM

## 2025-07-03 RX ORDER — ACETAMINOPHEN 325 MG/1
650 TABLET ORAL EVERY 6 HOURS PRN
Status: DISCONTINUED | OUTPATIENT
Start: 2025-07-03 | End: 2025-07-03 | Stop reason: HOSPADM

## 2025-07-03 RX ORDER — DIPHENOXYLATE HYDROCHLORIDE AND ATROPINE SULFATE 2.5; .025 MG/1; MG/1
1 TABLET ORAL 4 TIMES DAILY PRN
Status: DISCONTINUED | OUTPATIENT
Start: 2025-07-03 | End: 2025-07-03 | Stop reason: HOSPADM

## 2025-07-03 RX ORDER — ONDANSETRON 2 MG/ML
4 INJECTION INTRAMUSCULAR; INTRAVENOUS EVERY 6 HOURS PRN
Status: DISCONTINUED | OUTPATIENT
Start: 2025-07-03 | End: 2025-07-03 | Stop reason: HOSPADM

## 2025-07-03 RX ORDER — POTASSIUM CHLORIDE 7.45 MG/ML
10 INJECTION INTRAVENOUS PRN
Status: DISCONTINUED | OUTPATIENT
Start: 2025-07-03 | End: 2025-07-03 | Stop reason: HOSPADM

## 2025-07-03 RX ORDER — SODIUM CHLORIDE 0.9 % (FLUSH) 0.9 %
5-40 SYRINGE (ML) INJECTION PRN
Status: DISCONTINUED | OUTPATIENT
Start: 2025-07-03 | End: 2025-07-03 | Stop reason: HOSPADM

## 2025-07-03 RX ORDER — POTASSIUM CHLORIDE 1500 MG/1
40 TABLET, EXTENDED RELEASE ORAL PRN
Status: DISCONTINUED | OUTPATIENT
Start: 2025-07-03 | End: 2025-07-03 | Stop reason: HOSPADM

## 2025-07-03 RX ORDER — LORAZEPAM 2 MG/ML
1 INJECTION INTRAMUSCULAR
Status: DISCONTINUED | OUTPATIENT
Start: 2025-07-03 | End: 2025-07-03 | Stop reason: HOSPADM

## 2025-07-03 RX ORDER — ONDANSETRON 4 MG/1
4 TABLET, ORALLY DISINTEGRATING ORAL EVERY 8 HOURS PRN
Status: DISCONTINUED | OUTPATIENT
Start: 2025-07-03 | End: 2025-07-03 | Stop reason: HOSPADM

## 2025-07-03 RX ORDER — POLYETHYLENE GLYCOL 3350 17 G/17G
17 POWDER, FOR SOLUTION ORAL DAILY PRN
Status: DISCONTINUED | OUTPATIENT
Start: 2025-07-03 | End: 2025-07-03 | Stop reason: HOSPADM

## 2025-07-03 RX ORDER — ZOLPIDEM TARTRATE 5 MG/1
5 TABLET ORAL NIGHTLY PRN
Status: DISCONTINUED | OUTPATIENT
Start: 2025-07-03 | End: 2025-07-03 | Stop reason: HOSPADM

## 2025-07-03 RX ORDER — SODIUM CHLORIDE 9 MG/ML
INJECTION, SOLUTION INTRAVENOUS PRN
Status: DISCONTINUED | OUTPATIENT
Start: 2025-07-03 | End: 2025-07-03 | Stop reason: HOSPADM

## 2025-07-03 RX ORDER — SODIUM CHLORIDE 0.9 % (FLUSH) 0.9 %
5-40 SYRINGE (ML) INJECTION EVERY 12 HOURS SCHEDULED
Status: DISCONTINUED | OUTPATIENT
Start: 2025-07-03 | End: 2025-07-03 | Stop reason: HOSPADM

## 2025-07-03 RX ADMIN — ATORVASTATIN CALCIUM 5 MG: 10 TABLET, FILM COATED ORAL at 09:37

## 2025-07-03 RX ADMIN — HYDROCODONE BITARTRATE AND ACETAMINOPHEN 1 TABLET: 5; 325 TABLET ORAL at 18:19

## 2025-07-03 RX ADMIN — CYCLOBENZAPRINE 5 MG: 10 TABLET, FILM COATED ORAL at 12:49

## 2025-07-03 RX ADMIN — MORPHINE SULFATE 4 MG: 4 INJECTION, SOLUTION INTRAMUSCULAR; INTRAVENOUS at 07:19

## 2025-07-03 RX ADMIN — CYCLOBENZAPRINE 5 MG: 10 TABLET, FILM COATED ORAL at 20:08

## 2025-07-03 RX ADMIN — CETIRIZINE HYDROCHLORIDE 10 MG: 10 TABLET, FILM COATED ORAL at 09:38

## 2025-07-03 RX ADMIN — ENOXAPARIN SODIUM 30 MG: 100 INJECTION SUBCUTANEOUS at 09:39

## 2025-07-03 RX ADMIN — SODIUM CHLORIDE, PRESERVATIVE FREE 10 ML: 5 INJECTION INTRAVENOUS at 09:40

## 2025-07-03 RX ADMIN — HYDROCODONE BITARTRATE AND ACETAMINOPHEN 1 TABLET: 5; 325 TABLET ORAL at 11:21

## 2025-07-03 RX ADMIN — PANTOPRAZOLE SODIUM 40 MG: 40 TABLET, DELAYED RELEASE ORAL at 09:37

## 2025-07-03 ASSESSMENT — PAIN DESCRIPTION - LOCATION
LOCATION: HIP
LOCATION: BACK;HIP
LOCATION: HIP

## 2025-07-03 ASSESSMENT — PAIN SCALES - GENERAL
PAINLEVEL_OUTOF10: 8
PAINLEVEL_OUTOF10: 3
PAINLEVEL_OUTOF10: 8
PAINLEVEL_OUTOF10: 8
PAINLEVEL_OUTOF10: 7
PAINLEVEL_OUTOF10: 7
PAINLEVEL_OUTOF10: 8
PAINLEVEL_OUTOF10: 8
PAINLEVEL_OUTOF10: 7
PAINLEVEL_OUTOF10: 7
PAINLEVEL_OUTOF10: 8
PAINLEVEL_OUTOF10: 7
PAINLEVEL_OUTOF10: 7
PAINLEVEL_OUTOF10: 8
PAINLEVEL_OUTOF10: 7
PAINLEVEL_OUTOF10: 7
PAINLEVEL_OUTOF10: 8
PAINLEVEL_OUTOF10: 10
PAINLEVEL_OUTOF10: 7
PAINLEVEL_OUTOF10: 7
PAINLEVEL_OUTOF10: 8

## 2025-07-03 ASSESSMENT — ENCOUNTER SYMPTOMS
SHORTNESS OF BREATH: 0
TROUBLE SWALLOWING: 0
BACK PAIN: 1
ABDOMINAL PAIN: 0
PHOTOPHOBIA: 0

## 2025-07-03 ASSESSMENT — PAIN DESCRIPTION - ORIENTATION
ORIENTATION: LEFT
ORIENTATION: LEFT
ORIENTATION: LOWER;LEFT

## 2025-07-03 ASSESSMENT — PAIN - FUNCTIONAL ASSESSMENT
PAIN_FUNCTIONAL_ASSESSMENT: PREVENTS OR INTERFERES SOME ACTIVE ACTIVITIES AND ADLS

## 2025-07-03 ASSESSMENT — PAIN DESCRIPTION - DESCRIPTORS
DESCRIPTORS: ACHING;DISCOMFORT;SPASM
DESCRIPTORS: ACHING;DISCOMFORT;THROBBING;PRESSURE
DESCRIPTORS: ACHING;DISCOMFORT;SHARP;SPASM

## 2025-07-03 NOTE — CARE COORDINATION
7/3/2025 Update CM note: Referrals made thru [facility] for [facility] and [facility] the [facility]. Will need precert when accepted  Electronically signed by Jeannette Abreu RN CM on 7/3/2025 at 1:38 PM

## 2025-07-03 NOTE — CONSULTS
tablet 5 mg  5 mg Oral Daily Hansel Schaffer, DO   5 mg at 07/03/25 0937    cetirizine (ZYRTEC) tablet 10 mg  10 mg Oral Daily Hansel Schaffer, DO   10 mg at 07/03/25 0938    cyclobenzaprine (FLEXERIL) tablet 5 mg  5 mg Oral TID PRN Hansel Schaffer, DO   5 mg at 07/03/25 1249    diphenoxylate-atropine (LOMOTIL) 2.5-0.025 MG per tablet 1 tablet  1 tablet Oral 4x Daily PRN Hansel Schaffer DO        pantoprazole (PROTONIX) tablet 40 mg  40 mg Oral QAM AC Hansel Schaffer DO   40 mg at 07/03/25 0937    zolpidem (AMBIEN) tablet 5 mg  5 mg Oral Nightly PRN Hansel Schaffer,         HYDROcodone-acetaminophen (NORCO) 5-325 MG per tablet 1 tablet  1 tablet Oral Q6H PRN Hansel Schaffer DO   1 tablet at 07/03/25 1121    sodium chloride flush 0.9 % injection 5-40 mL  5-40 mL IntraVENous 2 times per day Hansel Schaffer DO   10 mL at 07/03/25 0940    sodium chloride flush 0.9 % injection 5-40 mL  5-40 mL IntraVENous PRN Hansel Schaffer DO        0.9 % sodium chloride infusion   IntraVENous PRN Hansel Schaffer DO        potassium chloride (KLOR-CON M) extended release tablet 40 mEq  40 mEq Oral PRN Hansel Schaffer DO        Or    potassium bicarb-citric acid (EFFER-K) effervescent tablet 40 mEq  40 mEq Oral PRN Hansel Schaffer DO        Or    potassium chloride 10 mEq/100 mL IVPB (Peripheral Line)  10 mEq IntraVENous PRN Hansel Schaffer DO        magnesium sulfate 2000 mg in water 50 mL IVPB  2,000 mg IntraVENous PRN Hansel Schaffer DO        enoxaparin Sodium (LOVENOX) injection 30 mg  30 mg SubCUTAneous Daily Hansel Schaffer DO   30 mg at 07/03/25 0939    ondansetron (ZOFRAN-ODT) disintegrating tablet 4 mg  4 mg Oral Q8H PRN Hansel Schaffer DO        Or    ondansetron (ZOFRAN) injection 4 mg  4 mg IntraVENous Q6H PRN Hansel Schaffer, DO        polyethylene glycol (GLYCOLAX) packet 17 g  17 g Oral Daily PRN Hansel Schaffer, DO        acetaminophen (TYLENOL) tablet 650 mg  650 mg

## 2025-07-03 NOTE — CARE COORDINATION
07/03/25 Case Management note; Patient ordered MrI with neurosurgery consult. Patient refusing MrI. Dr Schaffer notified. Neurosurgery notified. Per Dianna EVANS for neurosurgery: patient can follow up in the office for evaluation and treatment. Electronically signed by Emelyn Witt RN CM on 7/3/2025 at 9:37 AM

## 2025-07-03 NOTE — CARE COORDINATION
7/3/2025m Update CM note: Discussed Skilled facility option for PT/OT and patient and daughters are agreeable.Patient wants to confirm choices with her  who is on his way to hospital. She is considering [facility] and [facility].will follow to confirm choices.  Electronically signed by RADHA Alvarez 7/3/2025 at 1:33 PM

## 2025-07-03 NOTE — PROGRESS NOTES
Physical Therapy  Physical Therapy Initial Assessment    Name: Sharon Velasco  : 1950  MRN: [id number]      Date of Service: 7/3/2025    Evaluating PT:  Alexis Teague PT, DPT [id number]    Room #:  5208/5208-  Diagnosis:  Intractable back pain [M54.9]  Back pain at L4-L5 level [M54.50]  PMHx/PSHx:   has a past medical history of Anxiety, Arthritis, GERD (gastroesophageal reflux disease), H/O migraine, History of EKG, Hyperlipidemia, Insomnia, Overactive bladder, Preoperative clearance, Sinus problem, and Ventricular arrhythmia.   has a past surgical history that includes  section; Hemorrhoid surgery; blepharoplasty (Bilateral); knee surgery (Right, 5/11/15); Tooth Extraction (2016); Tonsillectomy (Right, 2016); Finger trigger release (Right, 2005); joint replacement; joint replacement (Right, 2015); Dilation and curettage of uterus (1976); and Colonoscopy.  Procedure/Surgery:  None  Precautions:  Falls, spinal neutrality for comfort, L knee buckling  Equipment Needs:  TBD    SUBJECTIVE:    Pt lives with  in a 1 story home with 3 stair(s) to enter and 1 rail(s).  Pt typically ambulated with no AD prior to admission; has been using FWW for past week.    OBJECTIVE:   Initial Evaluation  Date: 7/3/2025 Treatment Short Term/ Long Term   Goals   AM-PAC 6 Clicks      Was pt agreeable to Eval/treatment? Yes     Does pt have pain? 8/10 L hip     Bed Mobility  Rolling: SBA  Supine to sit: SBA  Sit to supine: NT  Scooting: NT  Rolling: Independent  Supine to sit: Independent  Sit to supine: Independent  Scooting: Independent   Transfers Sit to stand: Kyle  Stand to sit: Kyle  Stand pivot: NT  Sit to stand: Mod I  Stand to sit: Mod I  Stand pivot: Mod I with AAD   Ambulation    15 feet x2 with WW Kyle  100 feet with AAD Mod I   Stair negotiation: ascended and descended  NT  3 step(s) with 1 rail(s) Mod I   ROM BUE:  See OT note  BLE:  WFL     Strength BUE:  See OT

## 2025-07-03 NOTE — PROGRESS NOTES
4 Eyes Skin Assessment     NAME:  Sharon Velasco  YOB: 1950  MEDICAL RECORD NUMBER:  [id number]    The patient is being assessed for  Admission    I agree that at least one RN has performed a thorough Head to Toe Skin Assessment on the patient. ALL assessment sites listed below have been assessed.      Areas assessed by both nurses:    Head, Face, Ears, Shoulders, Back, Chest, Arms, Elbows, Hands, Sacrum. Buttock, Coccyx, Ischium, Legs. Feet and Heels, and Under Medical Devices         Does the Patient have a Wound? No noted wound(s)       Akbar Prevention initiated by RN: Yes  Wound Care Orders initiated by RN: No    Pressure Injury (Stage 1,2,3,4, Unstageable, DTI, NWPT, and Complex wounds) if present, place Wound referral order by RN under : No    New Ostomies, if present place, Ostomy referral order under : No     Nurse 1 eSignature: Electronically signed by Daniel Hu RN on 7/3/25 at 6:52 AM EDT    **SHARE this note so that the co-signing nurse can place an eSignature**    Nurse 2 eSignature: Electronically signed by hJonny López RN on 7/3/25 at 6:56 AM EDT

## 2025-07-03 NOTE — CARE COORDINATION
07/03/25 Update CM Note: patient is accepted at [facility]. Precert is started today. 7000/saumya/destination completed. Ambulance form initiated and will need completion at discharge. Envelope placed in soft chart.Electronically signed by Emelyn Witt RN CM on 7/3/2025 at 3:13 PM

## 2025-07-03 NOTE — DISCHARGE INSTR - COC
Continuity of Care Form    Patient Name: Sharon Velasco   :  1950  MRN:  [id number]    Admit date:  2025  Discharge date:  7/3/2025    Code Status Order: Full Code   Advance Directives:     Admitting Physician:  Hansel Schaffer DO  PCP: Toby Robles MD    Discharging Nurse: jabari see  Discharging Hospital Unit/Room#: 5208/5208-A  Discharging Unit Phone Number: [phone]    Emergency Contact:   Extended Emergency Contact Information  Primary Emergency Contact: Roosevelt Velasco  Address: [address] [place] [address] of [place]  Home Phone: [phone]  Mobile Phone: [phone]  Relation: Spouse    Past Surgical History:  Past Surgical History:   Procedure Laterality Date    BLEPHAROPLASTY Bilateral      SECTION      COLONOSCOPY      DILATION AND CURETTAGE OF UTERUS  1976    FINGER TRIGGER RELEASE Right 2005    ring    HEMORRHOID SURGERY      JOINT REPLACEMENT      JOINT REPLACEMENT Right 2015    Medial Unicondylar Arthroplasty    KNEE SURGERY Right 5/11/15    partial    TONSILLECTOMY Right 2016    with right tongue biopsy    TOOTH EXTRACTION  2016       Immunization History:   Immunization History   Administered Date(s) Administered    COVID-19, MODERNA, , (age 12y+), IM, 50mcg/0.5mL 2024    COVID-19, PFIZER, , (age 12y+), IM, 30mcg/0.3mL 2023       Active Problems:  Patient Active Problem List   Diagnosis Code    Right knee DJD M17.11    GERD (gastroesophageal reflux disease) K21.9    Anxiety F41.9    Acute pain of right knee M25.561    History of partial knee replacement Z96.659    Intractable back pain M54.9    Back pain at L4-L5 level M54.50       Isolation/Infection:   Isolation            No Isolation          Patient Infection Status    None to display         Nurse Assessment:  Last Vital Signs: /65   Pulse 91   Temp 97.8 °F (36.6 °C) (Temporal)   Resp 16   Ht 1.448 m (4' 9.01\")   Wt

## 2025-07-03 NOTE — PROGRESS NOTES
OCCUPATIONAL THERAPY INITIAL EVALUATION    [facility] [address] [place], [place]      Date:7/3/2025                                                Patient Name: Sharon Velasco  MRN: [id number]  : 1950  Room: [address] [place] [address]     Evaluating OT:Pamella Mejia, OTR/L   License #  [id number]     Referring Provider: Hansel Schaffer DO   Specific Provider Orders/Date: OT evaluation & treatment      Diagnosis: Intractable back pain [M54.9]  Back pain at L4-L5 level [M54.50]    Pertinent Medical History:  has a past medical history of Anxiety, Arthritis, GERD (gastroesophageal reflux disease), H/O migraine, History of EKG, Hyperlipidemia, Insomnia, Overactive bladder, Preoperative clearance, Sinus problem, and Ventricular arrhythmia.  Surgery: none this admit  Past Surgical History:  has a past surgical history that includes  section; Hemorrhoid surgery; blepharoplasty (Bilateral); knee surgery (Right, 5/11/15); Tooth Extraction (2016); Tonsillectomy (Right, 2016); Finger trigger release (Right, 2005); joint replacement; joint replacement (Right, 2015); Dilation and curettage of uterus (1976); and Colonoscopy.    Precautions:  Fall Risk, neutral spine for comfort, L knee tawny    Assessment of current deficits   [x] Functional mobility   [x]ADLs         [x] Strength  [x]Cognition   [x] Functional transfers  [x] IADLs  [x] Safety Awareness [x]Endurance   [x] Fine Coordination   [x] Balance [] Vision/perception [x]Sensation     []Gross Motor Coordination  [] ROM [] Delirium  [x] Motor Control      OT PLAN OF CARE   OT POC based on physician orders, patient diagnosis and results of clinical assessment     Frequency/Duration: 2-4 days/wk for 2 weeks PRN   Specific OT Treatment Interventions to include:   Instruction/training on adapted ADL techniques and AE recommendations to increase functional independence within

## 2025-07-03 NOTE — PROGRESS NOTES
Patient refused to do MRI even with Ativan .said she is so much clostrophobic and if its really needed she wants it under aneshtesia.called  .Hoa from answering service responded and will pass this message to

## 2025-07-03 NOTE — H&P
[facility]              [address] [place] [address]                           HISTORY & PHYSICAL      PATIENT NAME: LUANA PLUNKETT             : 1950  MED REC NO: [id number]                        ROOM: 5208  ACCOUNT NO: [id number]                       ADMIT DATE: 2025  PROVIDER: Hansel Schaffer DO      PRIMARY CARE PHYSICIAN:  Travis    CHIEF COMPLAINT:  Back pain, left hip pain.    HISTORY OF PRESENT ILLNESS:  The patient is a 74-year-old  female who presented to the emergency room complaining of left hip pain and back pain for 3 days.  She was seen in the emergency room on more than one occasion.  She had a lumbar spine CT which revealed severe DDD changes.  CT left hip with some mild degenerative arthritis changes.  She was admitted for further evaluation and treatment.    MEDICATIONS:  Prior to admission, Lipitor, Fioricet, Zyrtec, Flexeril p.r.n., Lomotil p.r.n., gabapentin, Norco, Prevacid, multivitamin, prednisone started in the ER, Imitrex, Ambien.    PAST MEDICAL HISTORY:  Migraine headache, elevated cholesterol, seasonal allergies, and osteoarthritis.    PAST SURGICAL HISTORY:  Right tonsillectomy, wisdom teeth extraction, bilateral eye cataract surgery, right partial knee replacement.    REVIEW OF SYSTEMS:  Remarkable for above-stated chief complaint.    ALLERGIES:  NONE KNOWN.      SOCIAL HISTORY:  No tobacco.  No alcohol.    PHYSICAL EXAMINATION:  GENERAL APPEARANCE:  Physical exam reveals a 74-year-old  female who is alert and oriented x3, cooperative, and a good historian.  VITAL SIGNS:  On admission, temperature 98.7, pulse 81, respirations 18, blood pressure 133/79.  HEENT:  Head normocephalic, atraumatic.  Eyes, pupils equal and reactive to light.  Extraocular muscles intact.  Fundi not well visualized.  Nose, no obstruction, polyp, or discharge noted.  Mouth, mucosa without lesion.  Pharynx,

## 2025-07-03 NOTE — PLAN OF CARE
Problem: Discharge Planning  Goal: Discharge to home or other facility with appropriate resources  7/3/2025 1947 by Elzbieta Fuller, RN  Outcome: Progressing     Problem: Safety - Adult  Goal: Free from fall injury  7/3/2025 1947 by Elzbieta Fuller, RN  Outcome: Progressing

## 2025-07-03 NOTE — PROGRESS NOTES
Spiritual Health History and Assessment/Progress Note  [facility]    (P) Initial Encounter,  ,  ,      Name: Sharon Velasco MRN: [id number]    Age: 74 y.o.     Sex: female   Language: English   Christianity: None   Intractable back pain     Date: 7/3/2025                           Spiritual Assessment began in [facility] NEURO SPINE        Referral/Consult From: (P) Rounding   Encounter Overview/Reason: (P) Initial Encounter  Service Provided For: (P) Patient, Family    Bren, Belief, Meaning:   Patient identifies as spiritual, is connected with a bren tradition or spiritual practice, and has beliefs or practices that help with coping during difficult times  Family/Friends identify as spiritual, are connected with a bren tradition or spiritual practice, and have beliefs or practices that help with coping during difficult times      Importance and Influence:  Patient has spiritual/personal beliefs that influence decisions regarding their health  Family/Friends have spiritual/personal beliefs that influence decisions regarding the patient's health    Community:  Patient is connected with a spiritual community and feels well-supported. Support system includes: Spouse/Partner  Family/Friends are connected with a spiritual community: and feel well-supported. Support system includes: Unknown    Assessment and Plan of Care:     Patient Interventions include: Facilitated expression of thoughts and feelings, Explored spiritual coping/struggle/distress, Engaged in theological reflection, and Affirmed coping skills/support systems  Family/Friends Interventions include: Facilitated expression of thoughts and feelings, Explored spiritual coping/struggle/distress, Engaged in theological reflection, and Affirmed coping skills/support systems    Patient Plan of Care: Spiritual Care available upon further referral  Family/Friends Plan of Care: Spiritual Care available upon further referral    Electronically signed by

## 2025-07-03 NOTE — CARE COORDINATION
7/3/2025 Transition Of Care Note: Patient admitted 7/2  as observation from ER with complaints of left hip pain and unable to ambulate. She was at [facility] on Monday 7/1 and sent home with prednisone, naproxyn & hydrocodone but did not take d/t unable to eat ! She lives at home with  who is able to assist her as needed. She lives in 1 story house with 2 outside steps with 1 rail.She has a cane and walker at home and can drive. She fell  d/t the pain at home and landed on her knees.She is usually IADL'S. Her sisiter Laura is at bedside. She refused MRI d/t anxiety.Attending and Neurosurgery aware. Discussed Home health care for pt/ot versus SNF ,She wants to discuss this with her .  Electronically signed by Jeannette Abreu RN CMon 7/3/2025 at 11:20 AM   Case Management Assessment  Initial Evaluation    Date/Time of Evaluation: 7/3/2025 11:20 AM  Assessment Completed by: Jeannette Abreu RN    If patient is discharged prior to next notation, then this note serves as note for discharge by case management.    Patient Name: Sharon Velasco                   YOB: 1950  Diagnosis: Intractable back pain [M54.9]  Back pain at L4-L5 level [M54.50]                   Date / Time: 7/2/2025  2:54 PM    Patient Admission Status: Observation   Readmission Risk (Low < 19, Mod (19-27), High > 27): Readmission Risk Score: 9.7    Current PCP: Toby Robles MD  PCP verified by ? Yes    Chart Reviewed: Yes      History Provided by: Patient  Patient Orientation: Alert and Oriented    Patient Cognition: Alert    Hospitalization in the last 30 days (Readmission):  No    If yes, Readmission Assessment in  Navigator will be completed.    Advance Directives:      Code Status: Full Code   Patient's Primary Decision Maker is: Legal Next of Kin      Discharge Planning:    Patient lives with: Spouse/Significant Other Type of Home: House  Primary Care Giver: Self (lives at home with  who is able to assist

## 2025-07-03 NOTE — PROGRESS NOTES
Patient admitted from ED  around 0520 Oncology admission pt complained of back and left hip pain 10/10, pt didn't have any pain meds ordered. I called Dr Schaffer, and he said he was going to come and see the pt shortly.

## 2025-07-03 NOTE — ED NOTES
ED to Inpatient Handoff Report    Notified floor that electronic handoff available and patient ready for transport to room 5208.    Safety Risks: falls    Patient in Restraints: no    Constant Observer or Patient : no    Telemetry Monitoring Ordered :no           Order to transfer to unit without monitor:n/a    Last MEWS: 1 Time completed: 0407    Deterioration Index Score:   Predictive Model Details          23 (Normal)  Factor Value    Calculated 7/3/2025 04:20 59% Age 74 years old    Deterioration Index Model 16% Sodium abnormal (130 mmol/L)     11% Respiratory rate 18     9% Systolic 144     2% Pulse 89     1% Hematocrit abnormal (32.2 %)     1% Potassium 3.8 mmol/L     1% Pulse oximetry 98 %     1% Temperature 97.5 °F (36.4 °C)     0% WBC count 7.3 k/uL        Vitals:    07/02/25 1504 07/02/25 2122 07/03/25 0206 07/03/25 0407   BP: 133/79 137/66 133/81 (!) 144/70   Pulse: 81 79 75 89   Resp: 18      Temp: 98.7 °F (37.1 °C) 97.6 °F (36.4 °C) 97.6 °F (36.4 °C) 97.5 °F (36.4 °C)   SpO2: 96% 98% 98% 98%   Weight: 44.5 kg (98 lb)            Opportunity for questions and clarification was provided.

## 2025-07-03 NOTE — PROGRESS NOTES
Problem: Gas Exchange Impaired  Goal: Optimal Gas Exchange  Outcome: Ongoing, Progressing     Problem: Breathing Pattern Ineffective  Goal: Effective Breathing Pattern  Outcome: Ongoing, Progressing      Nurse to nurse report given to RN Becky in [facility] place and fax all documents